# Patient Record
Sex: FEMALE | Race: WHITE | NOT HISPANIC OR LATINO | Employment: OTHER | URBAN - METROPOLITAN AREA
[De-identification: names, ages, dates, MRNs, and addresses within clinical notes are randomized per-mention and may not be internally consistent; named-entity substitution may affect disease eponyms.]

---

## 2017-06-05 ENCOUNTER — OFFICE VISIT (OUTPATIENT)
Dept: OPHTHALMOLOGY | Facility: CLINIC | Age: 77
End: 2017-06-05
Payer: MEDICARE

## 2017-06-05 ENCOUNTER — CLINICAL SUPPORT (OUTPATIENT)
Dept: OPHTHALMOLOGY | Facility: CLINIC | Age: 77
End: 2017-06-05
Payer: MEDICARE

## 2017-06-05 DIAGNOSIS — H49.02: ICD-10-CM

## 2017-06-05 DIAGNOSIS — D32.0 INTRACRANIAL MENINGIOMA: ICD-10-CM

## 2017-06-05 DIAGNOSIS — H53.412 CENTRAL SCOTOMA, LEFT: Primary | ICD-10-CM

## 2017-06-05 PROCEDURE — 92004 COMPRE OPH EXAM NEW PT 1/>: CPT | Mod: S$PBB,,, | Performed by: OPHTHALMOLOGY

## 2017-06-05 PROCEDURE — 92083 EXTENDED VISUAL FIELD XM: CPT | Mod: 26,S$PBB,, | Performed by: OPHTHALMOLOGY

## 2017-06-05 PROCEDURE — 99999 PR PBB SHADOW E&M-NEW PATIENT-LVL III: CPT | Mod: PBBFAC,,, | Performed by: OPHTHALMOLOGY

## 2017-06-05 RX ORDER — LEVOTHYROXINE SODIUM 88 UG/1
88 TABLET ORAL DAILY
COMMUNITY
End: 2017-10-30

## 2017-06-05 RX ORDER — ALPRAZOLAM 0.5 MG/1
TABLET ORAL
Refills: 0 | COMMUNITY
Start: 2017-03-30

## 2017-06-05 RX ORDER — PRAVASTATIN SODIUM 40 MG/1
40 TABLET ORAL DAILY
COMMUNITY

## 2017-06-05 RX ORDER — PANTOPRAZOLE SODIUM 40 MG/1
40 TABLET, DELAYED RELEASE ORAL DAILY
COMMUNITY

## 2017-06-05 RX ORDER — HYDRALAZINE HYDROCHLORIDE 50 MG/1
50 TABLET, FILM COATED ORAL 3 TIMES DAILY
COMMUNITY

## 2017-06-05 RX ORDER — CARVEDILOL 25 MG/1
25 TABLET ORAL 2 TIMES DAILY WITH MEALS
COMMUNITY

## 2017-06-05 RX ORDER — CLONIDINE 0.3 MG/24H
1 PATCH, EXTENDED RELEASE TRANSDERMAL
COMMUNITY

## 2017-06-05 RX ORDER — LISINOPRIL 10 MG/1
10 TABLET ORAL DAILY
COMMUNITY
End: 2017-06-08

## 2017-06-05 RX ORDER — ASPIRIN 81 MG/1
81 TABLET ORAL DAILY
COMMUNITY

## 2017-06-05 RX ORDER — SOTALOL HYDROCHLORIDE 120 MG/1
80 TABLET ORAL EVERY 12 HOURS
COMMUNITY

## 2017-06-05 NOTE — LETTER
June 5, 2017      Alphonso Flores MD  [address] [facility] [address]           [facility] - Ophthalmology  [address]  [place] [address]  Phone: [phone]  Fax: [phone]          Patient: Mireille Amos   MR Number: [id number]   YOB: 1940   Date of Visit: 6/5/2017       Dear Dr. Alphonso Flores:    Thank you for referring Mireille Amos to me for evaluation. Attached you will find relevant portions of my assessment and plan of care.    If you have questions, please do not hesitate to call me. I look forward to following Mireille Amos along with you.    Sincerely,    Marc Weber MD    Enclosure  CC:  No Recipients    If you would like to receive this communication electronically, please contact [email or web address] or [phone] to request more information on [employer] Link access.    For providers and/or their staff who would like to refer a patient to [facility], please contact us through our one-stop-shop provider referral line, [facility], at [phone].    If you feel you have received this communication in error or would no longer like to receive these types of communications, please e-mail [email or web address]

## 2017-06-05 NOTE — PROGRESS NOTES
HPI     Referred by Dr.Jonathan Flores  2nd Opinion  Dx w/mass on the brain 04/2017. Pt states OS no vision since 2013 and   gradually bulging.  Initial vision loss was contributed to ION or other ischemic event   Review HVF    No pain.     I have personally interviewed the patient, reviewed the history and   examined the patient and agree with the technician's exam.    Last edited by Marc Weber MD on 6/5/2017  2:05 PM. (History)        ROS     Positive for: Gastrointestinal, Neurological, Endocrine, Cardiovascular,   Eyes    Negative for: Constitutional, Skin, Genitourinary, Musculoskeletal, HENT,   Respiratory, Psychiatric, Allergic/Imm, Heme/Lymph    Last edited by Marc Weber MD on 6/5/2017  2:07 PM. (History)        Assessment /Plan     For exam results, see Encounter Report.    Central scotoma, left  -     Louie Visual Field - OU - Extended - Both Eyes; Future  -     Ambulatory Referral to Neurosurgery    Intracranial meningioma  -     Ambulatory Referral to Neurosurgery    Total left oculomotor nerve palsy  -     Ambulatory Referral to Neurosurgery      Ms. Amos needs a neurosurgical evaluation. I will repeat her exam and visual field testing in six months.

## 2017-06-08 ENCOUNTER — OFFICE VISIT (OUTPATIENT)
Dept: NEUROSURGERY | Facility: CLINIC | Age: 77
End: 2017-06-08
Payer: MEDICARE

## 2017-06-08 VITALS
DIASTOLIC BLOOD PRESSURE: 87 MMHG | HEIGHT: 63 IN | BODY MASS INDEX: 26.1 KG/M2 | WEIGHT: 147.31 LBS | HEART RATE: 86 BPM | SYSTOLIC BLOOD PRESSURE: 150 MMHG

## 2017-06-08 DIAGNOSIS — D32.9 MENINGIOMA: Primary | ICD-10-CM

## 2017-06-08 PROCEDURE — 1126F AMNT PAIN NOTED NONE PRSNT: CPT | Mod: ,,, | Performed by: NEUROLOGICAL SURGERY

## 2017-06-08 PROCEDURE — 99999 PR PBB SHADOW E&M-EST. PATIENT-LVL III: CPT | Mod: PBBFAC,,, | Performed by: NEUROLOGICAL SURGERY

## 2017-06-08 PROCEDURE — 99213 OFFICE O/P EST LOW 20 MIN: CPT | Mod: PBBFAC | Performed by: NEUROLOGICAL SURGERY

## 2017-06-08 PROCEDURE — 1159F MED LIST DOCD IN RCRD: CPT | Mod: ,,, | Performed by: NEUROLOGICAL SURGERY

## 2017-06-08 PROCEDURE — 99204 OFFICE O/P NEW MOD 45 MIN: CPT | Mod: S$PBB,,, | Performed by: NEUROLOGICAL SURGERY

## 2017-06-08 RX ORDER — LISINOPRIL 40 MG/1
TABLET ORAL
COMMUNITY
Start: 2017-06-06

## 2017-06-08 NOTE — LETTER
June 8, 2017      Marc Weber MD  [address]  [facility] [address]           [facility]  [address]  [facility] [address]  Phone: [phone]          Patient: Mireille Amos   MR Number: [id number]   YOB: 1940   Date of Visit: 6/8/2017       Dear Dr. Marc Weber:    Thank you for referring Mireille Amos to me for evaluation. Attached you will find relevant portions of my assessment and plan of care.    If you have questions, please do not hesitate to call me. I look forward to following Mireille Amos along with you.    Sincerely,    Franck Bates MD    Enclosure  CC:  No Recipients    If you would like to receive this communication electronically, please contact [email or web address] or [phone] to request more information on [employer] Link access.    For providers and/or their staff who would like to refer a patient to [facility], please contact us through our one-stop-shop provider referral line, [facility], at [phone].    If you feel you have received this communication in error or would no longer like to receive these types of communications, please e-mail [email or web address]

## 2017-06-08 NOTE — PROGRESS NOTES
This office note has been dictated.  June 8, 2017    Marc Weber M.D.  Department of Ophthalmology  [facility]    RE:  CLAUDIO IBRAHIM  [facility].:  [id number]    Dear Steven:    Claudio Ibrahim was seen in neurosurgical consultation at the office this   morning.  As you know, she is a 77-year-old lady who first began to note   decreased visual acuity about 7 years ago.  This was initially diagnosed as   cataract and she underwent a cataract extraction and use of drops with no   improvement in her vision.  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  She had an MRI scan done in [place].  I do not have a copy of   this.  She had subsequent CT scans without contrast and then more recently had a   CT with contrast showing a left sphenoid wing orbital meningioma.  She was   referred to you for neurophysiological evaluation and then subsequently for   neurosurgical evaluation.  She feels that vision in the right eye is normal.    She has only occasional headache.  She has no complaint of hearing, no   difficulty speaking or swallowing, no weakness and numbness in extremity.  She   feels that her balance is a little off.  She has had a couple of episodes   described as hallucinations.  One time she thought she saw two cats fighting   that were not there.  Another time she thought that a coffee pot had dropped to   the floor when this was not the case.  These episodes have not been accompanied   by any shaking or alteration of consciousness. Past medical history includes   coronary artery disease and placement of a cardiac pacemaker three years ago,   which had not allowed her to have MRI of the brain done.  She has a history of   hypertension.  She does complain of some fainting episodes.  These have been   evaluated by her cardiologist without a clear diagnosis.  She is , lives   at home.  She  still drives short distances and is able to care for herself.    On physical examination, she is a well-developed, well-nourished white lady who   is alert and cooperative.  Speech is somewhat hesitant and she is only a fair   historian.  Examination of the head shows no tenderness over the scalp.  As you   know, she has a left oculomotor palsy, the pupil is just slightly larger than   the right.  There is minimal ptosis, left CN VI is working normally.  The right   eye has full extraocular movements.  The left pupil is nonreactive.  The right   pupil reacts normally.  On funduscopy, the left optic nerve shows extreme   pallor, the right is normal.  There is no clear visual field loss to   confrontation in the right eye.  Hearing is generally good to finger rubbing.    The neck is supple.  On neurological examination, she is somewhat hesitant in   speech.  Finger-to-nose was done well.  Gait is somewhat slow, but not ataxic.    Other cranial nerve examination, she has normal sensation and movement of the   face.  The tongue protrudes in the midline.  She shows good strength in   extremities, normal sensation and symmetrical deep tendon reflexes.    CT scan of the head was done at [facility] in [place] on   05/04/17.  The brain is normally formed.  Ventricular size is normal.  There is   an enhancing mass along the medial sphenoid wing extending into the optic   foramen and probable cavernous sinus.  The temporal lobe is somewhat displaced   by the tumor.  The appearance is typical for sphenoid wing meningioma.    IMPRESSION:  Left sphenoid wing orbital meningioma.    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  The tumor is away from the chiasm and right   optic nerve.  You have planned to see her back for followup visual testing in   six months and I  will see her then with a followup CT of the head with contrast.    If treatment were to be considered, I believe stereotactic radiosurgery would   be the appropriate treatment for her.    Thank you very much for the opportunity to see her in neurosurgical   consultation.    Sincerely yours,      AMILCAR/LELIA  dd: 06/08/2017 09:25:36 (CDT)  td: 06/09/2017 07:46:11 (CDT)  Doc ID   #[id number]  Job ID #[id number]    CC: Marc Amos

## 2017-10-30 ENCOUNTER — OFFICE VISIT (OUTPATIENT)
Dept: OPHTHALMOLOGY | Facility: CLINIC | Age: 77
End: 2017-10-30
Payer: MEDICARE

## 2017-10-30 ENCOUNTER — CLINICAL SUPPORT (OUTPATIENT)
Dept: OPHTHALMOLOGY | Facility: CLINIC | Age: 77
End: 2017-10-30
Payer: MEDICARE

## 2017-10-30 ENCOUNTER — HOSPITAL ENCOUNTER (OUTPATIENT)
Dept: RADIOLOGY | Facility: HOSPITAL | Age: 77
Discharge: HOME | End: 2017-10-30
Attending: NEUROLOGICAL SURGERY
Payer: MEDICARE

## 2017-10-30 ENCOUNTER — OFFICE VISIT (OUTPATIENT)
Dept: NEUROSURGERY | Facility: CLINIC | Age: 77
End: 2017-10-30
Payer: MEDICARE

## 2017-10-30 VITALS
DIASTOLIC BLOOD PRESSURE: 87 MMHG | WEIGHT: 141.63 LBS | BODY MASS INDEX: 25.08 KG/M2 | TEMPERATURE: 98 F | HEART RATE: 72 BPM | SYSTOLIC BLOOD PRESSURE: 186 MMHG

## 2017-10-30 DIAGNOSIS — H53.412 SCOTOMA INVOLVING CENTRAL AREA OF LEFT EYE: ICD-10-CM

## 2017-10-30 DIAGNOSIS — D32.9 MENINGIOMA: ICD-10-CM

## 2017-10-30 DIAGNOSIS — H49.02: ICD-10-CM

## 2017-10-30 DIAGNOSIS — D32.9 MENINGIOMA: Primary | ICD-10-CM

## 2017-10-30 DIAGNOSIS — D32.0 INTRACRANIAL MENINGIOMA: Primary | ICD-10-CM

## 2017-10-30 LAB
CREAT SERPL-MCNC: 0.9 MG/DL (ref 0.5–1.4)
SAMPLE: NORMAL

## 2017-10-30 PROCEDURE — 25500020 PHARM REV CODE 255: Performed by: NEUROLOGICAL SURGERY

## 2017-10-30 PROCEDURE — 92083 EXTENDED VISUAL FIELD XM: CPT | Mod: PBBFAC | Performed by: OPHTHALMOLOGY

## 2017-10-30 PROCEDURE — 70470 CT HEAD/BRAIN W/O & W/DYE: CPT | Mod: TC

## 2017-10-30 PROCEDURE — 70470 CT HEAD/BRAIN W/O & W/DYE: CPT | Mod: 26,,, | Performed by: RADIOLOGY

## 2017-10-30 PROCEDURE — 99213 OFFICE O/P EST LOW 20 MIN: CPT | Mod: PBBFAC,25,27 | Performed by: OPHTHALMOLOGY

## 2017-10-30 PROCEDURE — 99999 PR PBB SHADOW E&M-EST. PATIENT-LVL III: CPT | Mod: PBBFAC,,, | Performed by: OPHTHALMOLOGY

## 2017-10-30 PROCEDURE — 99213 OFFICE O/P EST LOW 20 MIN: CPT | Mod: PBBFAC,25 | Performed by: NEUROLOGICAL SURGERY

## 2017-10-30 PROCEDURE — 99999 PR PBB SHADOW E&M-EST. PATIENT-LVL III: CPT | Mod: PBBFAC,,, | Performed by: NEUROLOGICAL SURGERY

## 2017-10-30 PROCEDURE — 99213 OFFICE O/P EST LOW 20 MIN: CPT | Mod: S$PBB,,, | Performed by: NEUROLOGICAL SURGERY

## 2017-10-30 PROCEDURE — 92012 INTRM OPH EXAM EST PATIENT: CPT | Mod: S$PBB,,, | Performed by: OPHTHALMOLOGY

## 2017-10-30 RX ORDER — LEVOTHYROXINE SODIUM 112 UG/1
TABLET ORAL
COMMUNITY
Start: 2017-08-29

## 2017-10-30 RX ORDER — CITALOPRAM 20 MG/1
TABLET, FILM COATED ORAL
COMMUNITY
Start: 2017-09-28

## 2017-10-30 RX ORDER — CLOPIDOGREL BISULFATE 75 MG/1
TABLET ORAL
COMMUNITY
Start: 2017-07-26

## 2017-10-30 RX ORDER — CLONIDINE HYDROCHLORIDE 0.3 MG/1
TABLET ORAL
COMMUNITY
Start: 2017-10-10

## 2017-10-30 RX ADMIN — IOHEXOL 75 ML: 350 INJECTION, SOLUTION INTRAVENOUS at 11:10

## 2017-10-30 NOTE — PROGRESS NOTES
This office note has been dictated.  Mireille Amos returned in neurosurgical followup to the office this morning.    She has been doing well over the last few months.  She has had no change in   vision in her right eye.  She remains blind in the left eye.  She has noted no   new speech difficulty, no difficulty with gait or balance.  She remains on   medication for hypertension, but is otherwise in reasonably good health.    On brief examination today, she is alert and cooperative and answers questions   appropriately.  She shows ptosis, outward deviation of the left eye.  She has no   light perception and the pupil is nonreactive.  The right eye shows full   extraocular movements, reactive pupil and she says that she is seeing well.  She   arose from a chair and walked without difficulty.  She shows no focal weakness   and seems otherwise neurologically intact.    CT scan of the brain with contrast was repeated at [facility] today and this   was compared to her earlier noncontrast scan from [place].  Better   delineated on the scan is the left cavernous sinus, sphenoid wing meningioma.    It is seen that this extends into the left orbit.  Given technique, this seems   about the same size as her previous scan of 12/03/15.    She seems neurologically stable.  She is to see Dr. Weber in neurophthalmology   followup this afternoon.  If there is no new visual loss, I believe we can have   her return in about one year in followup with contrast CT.      AMILCAR/LELIA  dd: 10/30/2017 12:46:54 (CDT)  td: 10/31/2017 05:58:47 (CDT)  Doc ID   #[id number]  Job ID #[id number]    CC: Mireille Amos

## 2017-10-30 NOTE — PROGRESS NOTES
HPI     Concerns About Ocular Health    Additional comments: Meningioma            Comments   DLS:06/05/2017 Tia  Patient here for 6 month follow up and Review   Pt states OD vision stable. No change in OS.  No pain.    I have personally interviewed the patient, reviewed the history and   examined the patient and agree with the technician's exam.       Last edited by Marc Weber MD on 10/30/2017  2:27 PM. (History)            Assessment /Plan     For exam results, see Encounter Report.    Intracranial meningioma  -     Louie Visual Field - OU - Extended - Both Eyes    Scotoma involving central area of left eye  -     Louie Visual Field - OU - Extended - Both Eyes    Total left oculomotor nerve palsy      Ms. Amos's visual function is stable. I will repeat her exam and visual fields in one year.

## 2017-10-30 NOTE — LETTER
[facility] - Ophthalmology  [address]  [facility] [address]  Phone: [phone]  Fax: [phone]   October 30, 2017    Franck Bates MD  [address]  [facility] [address]    Patient: Mireille Amos   MR Number: [id number]   YOB: 1940   Date of Visit: 10/30/2017       Dear Dr. Bates:    Thank you for referring Mireille Amos to me for evaluation. Here is my assessment and plan of care:    Assessment:   /Plan     For exam results, see Encounter Report.    Intracranial meningioma  -     Louie Visual Field - OU - Extended - Both Eyes    Scotoma involving central area of left eye  -     Louie Visual Field - OU - Extended - Both Eyes    Total left oculomotor nerve palsy      Ms. Colemans visual function is stable. I will repeat her exam and visual fields in one year.          Plan:       For exam results, see Encounter Report.    Intracranial meningioma  -     Louie Visual Field - OU - Extended - Both Eyes    Scotoma involving central area of left eye  -     Louie Visual Field - OU - Extended - Both Eyes    Total left oculomotor nerve palsy      Ms. Okeefe visual function is stable. I will repeat her exam and visual fields in one year.            Below you will find my full exam findings. If you have questions, please do not hesitate to call me. I look forward to following Ms. Mireille Amos along with you.    Sincerely,          Marc Weber MD       CC  Alphonso Flores MD             Base Eye Exam     Visual Acuity (Snellen - Linear)       Right Left    Dist cc 20/30 -1 NLP    Correction:  Glasses          Pupils       Dark Light Shape React APD    Right 5 3 Round Brisk None    Left 6 6 Round Minimal +4          Extraocular Movement       Right Left     0 0 0   0  0   0 0 0    -- -4 --   -4  0   -- -4 --       Large left exotropia          Neuro/Psych     Oriented x3:  Yes    Mood/Affect:  Normal            Slit Lamp and Fundus Exam     External Exam       Right  Left    External Normal Normal          Slit Lamp Exam       Right Left    Lids/Lashes Normal 4+ Ptosis    Conjunctiva/Sclera White and quiet White and quiet    Cornea Clear Clear    Anterior Chamber Deep and quiet Deep and quiet    Iris Round and reactive Round and reactive    Lens 2+ Nuclear sclerosis, 1+ Cortical cataract Posterior chamber intraocular lens    Vitreous Normal Normal          Fundus Exam       Right Left    Disc Normal 4+ Pallor    C/D Ratio 0.2 0.2    Macula Normal Normal    Vessels Normal Normal    Periphery  Normal